# Patient Record
Sex: MALE | Race: WHITE | NOT HISPANIC OR LATINO
[De-identification: names, ages, dates, MRNs, and addresses within clinical notes are randomized per-mention and may not be internally consistent; named-entity substitution may affect disease eponyms.]

---

## 2022-02-28 PROBLEM — Z00.00 ENCOUNTER FOR PREVENTIVE HEALTH EXAMINATION: Status: ACTIVE | Noted: 2022-02-28

## 2022-03-09 ENCOUNTER — APPOINTMENT (OUTPATIENT)
Dept: ENDOCRINOLOGY | Facility: CLINIC | Age: 27
End: 2022-03-09
Payer: COMMERCIAL

## 2022-03-09 VITALS
SYSTOLIC BLOOD PRESSURE: 123 MMHG | HEART RATE: 89 BPM | WEIGHT: 162 LBS | DIASTOLIC BLOOD PRESSURE: 84 MMHG | BODY MASS INDEX: 23.19 KG/M2 | HEIGHT: 70 IN

## 2022-03-09 DIAGNOSIS — E10.9 TYPE 1 DIABETES MELLITUS W/OUT COMPLICATIONS: ICD-10-CM

## 2022-03-09 PROCEDURE — 99204 OFFICE O/P NEW MOD 45 MIN: CPT | Mod: 25

## 2022-03-09 PROCEDURE — 95249 CONT GLUC MNTR PT PROV EQP: CPT

## 2022-03-09 RX ORDER — LANCETS
EACH MISCELLANEOUS
Qty: 100 | Refills: 5 | Status: ACTIVE | COMMUNITY
Start: 2022-03-09 | End: 1900-01-01

## 2022-03-10 LAB
ALBUMIN SERPL ELPH-MCNC: 5.1 G/DL
ALP BLD-CCNC: 75 U/L
ALT SERPL-CCNC: 20 U/L
ANION GAP SERPL CALC-SCNC: 15 MMOL/L
AST SERPL-CCNC: 17 U/L
BILIRUB SERPL-MCNC: 1 MG/DL
BUN SERPL-MCNC: 10 MG/DL
CALCIUM SERPL-MCNC: 10.3 MG/DL
CHLORIDE SERPL-SCNC: 99 MMOL/L
CHOLEST SERPL-MCNC: 230 MG/DL
CO2 SERPL-SCNC: 24 MMOL/L
CREAT SERPL-MCNC: 1.01 MG/DL
CREAT SPEC-SCNC: 77 MG/DL
EGFR: 105 ML/MIN/1.73M2
ESTIMATED AVERAGE GLUCOSE: 126 MG/DL
GLUCOSE SERPL-MCNC: 82 MG/DL
HBA1C MFR BLD HPLC: 6 %
HDLC SERPL-MCNC: 68 MG/DL
LDLC SERPL CALC-MCNC: 151 MG/DL
MICROALBUMIN 24H UR DL<=1MG/L-MCNC: 2.9 MG/DL
MICROALBUMIN/CREAT 24H UR-RTO: 37 MG/G
NONHDLC SERPL-MCNC: 163 MG/DL
POTASSIUM SERPL-SCNC: 4.4 MMOL/L
PROT SERPL-MCNC: 7.5 G/DL
SODIUM SERPL-SCNC: 138 MMOL/L
TRIGL SERPL-MCNC: 58 MG/DL
TSH SERPL-ACNC: 2.05 UIU/ML

## 2022-03-10 NOTE — PHYSICAL EXAM
[Alert] : alert [No Acute Distress] : no acute distress [No Proptosis] : no proptosis [No Lid Lag] : no lid lag [Normal Hearing] : hearing was normal [No LAD] : no lymphadenopathy [Thyroid Not Enlarged] : the thyroid was not enlarged [Clear to Auscultation] : lungs were clear to auscultation bilaterally [Normal S1, S2] : normal S1 and S2 [Regular Rhythm] : with a regular rhythm [No Edema] : no peripheral edema [Pedal Pulses Normal] : the pedal pulses are present [Normal Affect] : the affect was normal [Normal Mood] : the mood was normal [Acanthosis Nigricans] : no acanthosis nigricans [Foot Ulcers] : no foot ulcers [de-identified] : no onychomycoses, good hair growth on feet, (+) callous

## 2022-03-10 NOTE — HISTORY OF PRESENT ILLNESS
[FreeTextEntry1] : Diabetes diagnosed around age 23.\par Was found to have elevated glucose on routine labs, did not have any symptoms.  Initially was diagnosed as type 2, but then after seeing endocrine was told he is type 1.  He has never been hospitalized for diabetes.  no FH of diabetes or autoimmune disease.\par Currently is taking Toujeo 20 units/day and lispro 1 unit per 15-20 g, usually 2-4 units with meals.\par using Scott CGM:  14d avg 129, 84% in range, 14% high, 2% low, 97% CGM use\par Sugars mostly in range.  Some meals causing high sugars for a few hours.   Trend toward hypoglycemia in the early am.\par has hypo awareness.\par Exercises every day -- bikes  daily, also lifts at the gym 3x/week.  does not have hypoglycemia during or after exercise.\par no neuropathy symptoms \par last A1c was 6.0% last March\par \par Meds\par Toujeo 20 units/day\par lispro 1 unit per 15-20g carbs.   2-4 units.  correction 1-2 units to reduce glucose 100 mg/dl

## 2022-03-10 NOTE — CONSULT LETTER
[Dear  ___] : Dear  [unfilled], [Consult Letter:] : I had the pleasure of evaluating your patient, [unfilled]. [Please see my note below.] : Please see my note below. [Consult Closing:] : Thank you very much for allowing me to participate in the care of this patient.  If you have any questions, please do not hesitate to contact me. [Sincerely,] : Sincerely, [FreeTextEntry1] : Mr. Tang has type 1 diabetes that is well controlled.   A1c is 6.0%.  urine microalbumin is mildly elevated, which I would like to repeat (or you can).  LDL is pretty high but since he is very young, I likely will wait to start a statin unless there is strong family of early heart disease.\par  [FreeTextEntry3] : Rima Aviles MD\par Division of Endocrinology\par [facility]

## 2022-03-10 NOTE — ASSESSMENT
[FreeTextEntry1] : Type 1 diabetes, at goal.  Goal A1c < 7%\par Potential complications of diabetes reviewed (blindness, kidney disease, nerve damage) and importance of glucose control discussed to prevent complications. \par Continue frequent glucose monitoring to determine insulin dosing and monitor for hypoglycemia. Upgrade to Scott 2 so that he can get alerts when glucose is too high or too low.  Explained that CGM has 10-20% error on glucose readings, and he should confirm  high and low readings with glucometer. \par Discussed exercise effect on glucose:  depends on duration and intensity of exercise.  Cardio exercise tends to make sugars decrease, while high intensity or weight training tends to increase sugars.  Doing a mix of these exercises may prevent sugars going too high or low afterwards. \par Discussed pros and cons of insulin pump therapy.  Main benefit of pump is if he is having hypoglycemia during or after exercise, or if he needs different basal rate for sera effects, but he has neither of these, so at this point, he is managing diabetes well with MDI and does not need pump.\par RTO 6 months

## 2022-08-18 ENCOUNTER — APPOINTMENT (OUTPATIENT)
Dept: ENDOCRINOLOGY | Facility: CLINIC | Age: 27
End: 2022-08-18

## 2022-08-18 VITALS
DIASTOLIC BLOOD PRESSURE: 68 MMHG | HEIGHT: 70 IN | SYSTOLIC BLOOD PRESSURE: 103 MMHG | HEART RATE: 89 BPM | WEIGHT: 164 LBS | BODY MASS INDEX: 23.48 KG/M2

## 2022-08-18 LAB — HBA1C MFR BLD HPLC: 5.4

## 2022-08-18 PROCEDURE — 83036 HEMOGLOBIN GLYCOSYLATED A1C: CPT | Mod: QW

## 2022-08-18 PROCEDURE — 99214 OFFICE O/P EST MOD 30 MIN: CPT | Mod: 25

## 2022-08-18 PROCEDURE — 36415 COLL VENOUS BLD VENIPUNCTURE: CPT

## 2022-08-18 PROCEDURE — 95249 CONT GLUC MNTR PT PROV EQP: CPT

## 2022-08-18 NOTE — DATA REVIEWED
[No studies available for review at this time.] : No studies available for review at this time. [FreeTextEntry1] : 8/22  A1c 5.4% POC\par 3/22  A1c 6.0%, tot chol 230, trig 58, HDL 68, , urine alb/cr 37, TSH 2.05

## 2022-08-18 NOTE — ASSESSMENT
[FreeTextEntry1] : Type 1 diabetes, below goal.\par Cautioned about overcorrecting sugars and  frequent hypoglycemia, which can lead to hypoglycemia unawareness. Advised not to correct until sugars are over 200 mg/dl and use 75 for correction factor, which means he would only need 1 units if over 200 and 2 units for over 250.\par After giving a correction dose, wait at least 1 hour and if sugars are trending downward, he should not do another correction dose, even if sugars are still high (as long as it is decreasing)\par Change to Dexcom CGM so that he doesn't need to scan to see what sugars are.  Dexcom starter kit given.\par Continue frequent glucose monitoring to determine insulin dosing and monitor for hypoglycemia. \par He has high LDL but since he is still young, CV risk is very low, will wait on starting statin.\par Repeat urine albumin today; was high last check and I don't expect he should have elevated urine albumin since glucose control is very good.\par RTO 6 months

## 2022-08-18 NOTE — PHYSICAL EXAM
[Alert] : alert [No Acute Distress] : no acute distress [No Proptosis] : no proptosis [No Lid Lag] : no lid lag [Normal Hearing] : hearing was normal [No LAD] : no lymphadenopathy [Thyroid Not Enlarged] : the thyroid was not enlarged [Clear to Auscultation] : lungs were clear to auscultation bilaterally [Normal S1, S2] : normal S1 and S2 [Regular Rhythm] : with a regular rhythm [No Edema] : no peripheral edema [Pedal Pulses Normal] : the pedal pulses are present [Normal Affect] : the affect was normal [Normal Mood] : the mood was normal [Acanthosis Nigricans] : no acanthosis nigricans [Foot Ulcers] : no foot ulcers [de-identified] : no onychomycoses, good hair growth on feet, (+) callous

## 2022-08-18 NOTE — HISTORY OF PRESENT ILLNESS
[FreeTextEntry1] : Scott CGM report  reviewed: 14d average 113, 92% in range, 4% high, 4% low, 98% use\par Daily graph showing mostly in range.   sometimes low after lunch, probably due to overcorrecting.\par One day, was very high mid-day (300s) and then had prolonged hypoglycemia (in the 40s).  He was traveling this day, and on the plane, sugars were high and wouldn't come down.  and then sugars were very low.  But this happens very rarely for him\par He is correcting with 2-3 units for sugars over 170.\par he has hypo awareness, usually will get symptoms when sugars are in the 70s.  He lives alone.\par no neuropathy symptoms \par overdue for ophtho, last visit 18 months ago\par \par Meds\par Toujeo 18 units/day\par lispro 1 unit per 15g carbs.   2-4 units.

## 2022-08-19 LAB
CREAT SPEC-SCNC: 172 MG/DL
MICROALBUMIN 24H UR DL<=1MG/L-MCNC: <1.2 MG/DL
MICROALBUMIN/CREAT 24H UR-RTO: NORMAL MG/G

## 2022-10-19 ENCOUNTER — APPOINTMENT (OUTPATIENT)
Dept: ENDOCRINOLOGY | Facility: CLINIC | Age: 27
End: 2022-10-19

## 2022-10-21 ENCOUNTER — NON-APPOINTMENT (OUTPATIENT)
Age: 27
End: 2022-10-21

## 2022-10-21 ENCOUNTER — APPOINTMENT (OUTPATIENT)
Dept: OPHTHALMOLOGY | Facility: CLINIC | Age: 27
End: 2022-10-21

## 2022-10-21 PROCEDURE — 92134 CPTRZ OPH DX IMG PST SGM RTA: CPT

## 2022-10-21 PROCEDURE — 92004 COMPRE OPH EXAM NEW PT 1/>: CPT

## 2023-02-16 ENCOUNTER — APPOINTMENT (OUTPATIENT)
Dept: ENDOCRINOLOGY | Facility: CLINIC | Age: 28
End: 2023-02-16
Payer: COMMERCIAL

## 2023-02-16 VITALS
SYSTOLIC BLOOD PRESSURE: 125 MMHG | DIASTOLIC BLOOD PRESSURE: 77 MMHG | BODY MASS INDEX: 23.39 KG/M2 | WEIGHT: 163 LBS | HEART RATE: 74 BPM

## 2023-02-16 LAB — GLUCOSE BLDC GLUCOMTR-MCNC: 73

## 2023-02-16 PROCEDURE — 82962 GLUCOSE BLOOD TEST: CPT

## 2023-02-16 PROCEDURE — 99213 OFFICE O/P EST LOW 20 MIN: CPT | Mod: 25

## 2023-02-16 PROCEDURE — 95249 CONT GLUC MNTR PT PROV EQP: CPT

## 2023-02-16 RX ORDER — BLOOD SUGAR DIAGNOSTIC
STRIP MISCELLANEOUS 3 TIMES DAILY
Qty: 300 | Refills: 3 | Status: ACTIVE | COMMUNITY
Start: 2022-03-09 | End: 1900-01-01

## 2023-02-16 RX ORDER — BLOOD-GLUCOSE TRANSMITTER
EACH MISCELLANEOUS
Qty: 1 | Refills: 1 | Status: DISCONTINUED | COMMUNITY
Start: 2022-08-18 | End: 2023-02-16

## 2023-02-16 RX ORDER — FLASH GLUCOSE SENSOR
KIT MISCELLANEOUS
Qty: 2 | Refills: 5 | Status: DISCONTINUED | COMMUNITY
Start: 2022-03-09 | End: 2023-02-16

## 2023-02-16 NOTE — HISTORY OF PRESENT ILLNESS
[FreeTextEntry1] : He loves using Dexcom.  Adhesive is better and it stays on longer than Scott.\par Clarity report generated:  14d avg 157, 72% in range, 26% high, 1% low, 93% CGM use\par Daily graph showing mostly in range, kaelyn during the day.   Some highs after meals.   hypoglycemia due to overcorrection but is not frequent.\par He has hypo awareness.   He usually does not go low during exercise, but sometimes during cardio, when he does stair machine for more than 20 minutes.  He has glucose tabs with him.\par saw ophtho, 10/22:  no DR \par \par Meds\par Toujeo 16 units/day\par lispro 1 unit per 15g carbs.   2-4 units.

## 2023-02-16 NOTE — DATA REVIEWED
[No studies available for review at this time.] : No studies available for review at this time. [FreeTextEntry1] : 2/23 A1c 5.8% POC\par 8/22  A1c 5.4% POC, urine alb < 1.2\par 3/22  A1c 6.0%, tot chol 230, trig 58, HDL 68, , urine alb/cr 37, TSH 2.05

## 2023-02-16 NOTE — PHYSICAL EXAM
[Alert] : alert [No Acute Distress] : no acute distress [Normal Affect] : the affect was normal [Normal Mood] : the mood was normal [de-identified] : fingerstick 73

## 2023-02-16 NOTE — ASSESSMENT
[FreeTextEntry1] : Type 1 diabetes, below goal.  No known complications.\par continue current insulin doses.\par Discussed exercise effect on glucose:  depends on duration and intensity of exercise.  Cardio exercise tends to make sugars decrease, while high intensity or weight training tends to increase sugars.  Doing a mix of these exercises may prevent sugars going too high or low afterwards.   Glucose should be over 130 at the start of exercise, and if not, have a small snack before exercising.\par Suggested taking 1 -2 units less Toujeo on mornings he plans to be exercising, to prevent hypoglycemia after exercise.\par Discussed using correction factor to correct hyperglycemia.  He is still insulin sensitive, so he can try correction factor of 60 or 70.   TAke 1 units for glucose over 200 and 2 units if over 250.  \par Continue frequent glucose monitoring to determine insulin dosing and monitor for hypoglycemia.  Upgrade to Dexcom G7.\par Discussed advantages of insulin pump therapy; at this time, he doesn't need the pump since sugars are well controlled without frequent hypoglycemia.  \par RTO 9 months

## 2023-07-20 ENCOUNTER — NON-APPOINTMENT (OUTPATIENT)
Age: 28
End: 2023-07-20

## 2023-07-28 ENCOUNTER — NON-APPOINTMENT (OUTPATIENT)
Age: 28
End: 2023-07-28

## 2023-09-30 ENCOUNTER — RX RENEWAL (OUTPATIENT)
Age: 28
End: 2023-09-30

## 2024-01-01 ENCOUNTER — RX RENEWAL (OUTPATIENT)
Age: 29
End: 2024-01-01

## 2024-01-05 ENCOUNTER — RX RENEWAL (OUTPATIENT)
Age: 29
End: 2024-01-05

## 2024-01-22 RX ORDER — BLOOD-GLUCOSE,RECEIVER,CONT
EACH MISCELLANEOUS
Qty: 1 | Refills: 0 | Status: ACTIVE | COMMUNITY
Start: 2023-07-12 | End: 1900-01-01

## 2024-01-22 RX ORDER — INSULIN LISPRO 100 [IU]/ML
100 INJECTION, SOLUTION INTRAVENOUS; SUBCUTANEOUS
Qty: 2 | Refills: 1 | Status: ACTIVE | COMMUNITY
Start: 2022-03-09 | End: 1900-01-01

## 2024-02-14 ENCOUNTER — APPOINTMENT (OUTPATIENT)
Dept: ENDOCRINOLOGY | Facility: CLINIC | Age: 29
End: 2024-02-14
Payer: COMMERCIAL

## 2024-02-14 VITALS
SYSTOLIC BLOOD PRESSURE: 134 MMHG | HEART RATE: 74 BPM | DIASTOLIC BLOOD PRESSURE: 73 MMHG | BODY MASS INDEX: 24.68 KG/M2 | WEIGHT: 172 LBS

## 2024-02-14 LAB
GLUCOSE BLDC GLUCOMTR-MCNC: 130
HBA1C MFR BLD HPLC: 6.2

## 2024-02-14 PROCEDURE — 82962 GLUCOSE BLOOD TEST: CPT

## 2024-02-14 PROCEDURE — 83036 HEMOGLOBIN GLYCOSYLATED A1C: CPT | Mod: QW

## 2024-02-14 PROCEDURE — 99214 OFFICE O/P EST MOD 30 MIN: CPT | Mod: 25

## 2024-02-14 RX ORDER — BLOOD-GLUCOSE TRANSMITTER
EACH MISCELLANEOUS
Qty: 1 | Refills: 1 | Status: DISCONTINUED | COMMUNITY
Start: 2023-07-24 | End: 2024-02-14

## 2024-02-14 RX ORDER — PEN NEEDLE, DIABETIC 29 G X1/2"
31G X 5 MM NEEDLE, DISPOSABLE MISCELLANEOUS
Qty: 400 | Refills: 3 | Status: ACTIVE | COMMUNITY
Start: 2022-03-09 | End: 1900-01-01

## 2024-02-14 NOTE — PHYSICAL EXAM
[Alert] : alert [No Acute Distress] : no acute distress [No Proptosis] : no proptosis [No Lid Lag] : no lid lag [Normal Hearing] : hearing was normal [No LAD] : no lymphadenopathy [Thyroid Not Enlarged] : the thyroid was not enlarged [Clear to Auscultation] : lungs were clear to auscultation bilaterally [Normal S1, S2] : normal S1 and S2 [Regular Rhythm] : with a regular rhythm [No Edema] : no peripheral edema [Pedal Pulses Normal] : the pedal pulses are present [Acanthosis Nigricans] : no acanthosis nigricans [Foot Ulcers] : no foot ulcers [Normal Sensation on Monofilament Testing] : normal sensation on monofilament testing of lower extremities [Normal Affect] : the affect was normal [Normal Mood] : the mood was normal [de-identified] : fingerstick 130, 144 on Dexcom [de-identified] : no onychomycoses

## 2024-02-14 NOTE — ASSESSMENT
[FreeTextEntry1] : Type 1 diabetes, below goal.  No known complications. continue current insulin doses. Discussed  pro's and con's of insulin pump therapy.  Advantages  include use of different basal rates during the day (eg using increased rate to cover Rosita effect) and use of temporary basal rates after exercise to prevent hypoglycemia. Con's include higher expense, possible pump/pod malfunctions. Since his sugar control is good and he does not have frequent hypoglycemia, I don't think he NEEDS to go on the pump to improve glucose control but if he wants to go on pump, that would be fine. He probably will wait at least until pumps are compatible with Dexcom G7. Continue frequent glucose monitoring to determine insulin dosing and monitor for hypoglycemia.   Sample Lyumjev pen given for more rapid onset.  If he prefers this, we can change to Lyumjev pen. margaret recommended  RTO 1 year

## 2024-02-14 NOTE — HISTORY OF PRESENT ILLNESS
[FreeTextEntry1] : no health issues since last visit not having frequent hypoglycemia.  If he has hypoglycemia, it usually due to overbolusing lispro (overestimating his meal). Some mornings, sugars are high but rarely is over 200 in the morning. no SOB, chest pain, or neuropathy symptoms  saw garrywiley, 10/22:  no DR  He is considering insulin pump;  he has one friend using Omnipod and one using T slim and both say he should get on the pump. He finds sometimes that insulin is not working fast enough.  Meds Toujeo 20  units/day lispro 1 unit per 15g carbs.   2-4 units.

## 2024-02-15 LAB
ALBUMIN SERPL ELPH-MCNC: 4.6 G/DL
ALP BLD-CCNC: 61 U/L
ALT SERPL-CCNC: 20 U/L
ANION GAP SERPL CALC-SCNC: 12 MMOL/L
AST SERPL-CCNC: 20 U/L
BILIRUB SERPL-MCNC: 0.4 MG/DL
BUN SERPL-MCNC: 12 MG/DL
CALCIUM SERPL-MCNC: 9.4 MG/DL
CHLORIDE SERPL-SCNC: 102 MMOL/L
CHOLEST SERPL-MCNC: 185 MG/DL
CO2 SERPL-SCNC: 27 MMOL/L
CREAT SERPL-MCNC: 1.28 MG/DL
CREAT SPEC-SCNC: 114 MG/DL
EGFR: 78 ML/MIN/1.73M2
FRUCTOSAMINE SERPL-MCNC: 272 UMOL/L
GLUCOSE SERPL-MCNC: 155 MG/DL
HDLC SERPL-MCNC: 52 MG/DL
LDLC SERPL CALC-MCNC: 114 MG/DL
MICROALBUMIN 24H UR DL<=1MG/L-MCNC: <1.2 MG/DL
MICROALBUMIN/CREAT 24H UR-RTO: NORMAL MG/G
NONHDLC SERPL-MCNC: 133 MG/DL
POTASSIUM SERPL-SCNC: 4.4 MMOL/L
PROT SERPL-MCNC: 6.9 G/DL
SODIUM SERPL-SCNC: 141 MMOL/L
TRIGL SERPL-MCNC: 108 MG/DL
TSH SERPL-ACNC: 1.28 UIU/ML

## 2024-02-17 RX ORDER — INSULIN GLARGINE 300 U/ML
300 INJECTION, SOLUTION SUBCUTANEOUS
Qty: 3 | Refills: 3 | Status: ACTIVE | COMMUNITY
Start: 2022-03-09 | End: 1900-01-01

## 2024-04-18 RX ORDER — BLOOD-GLUCOSE SENSOR
EACH MISCELLANEOUS
Qty: 9 | Refills: 3 | Status: ACTIVE | COMMUNITY
Start: 2022-08-18 | End: 1900-01-01

## 2024-05-10 ENCOUNTER — APPOINTMENT (OUTPATIENT)
Dept: OPHTHALMOLOGY | Facility: CLINIC | Age: 29
End: 2024-05-10
Payer: COMMERCIAL

## 2024-05-10 ENCOUNTER — NON-APPOINTMENT (OUTPATIENT)
Age: 29
End: 2024-05-10

## 2024-05-10 PROCEDURE — 92134 CPTRZ OPH DX IMG PST SGM RTA: CPT

## 2024-05-10 PROCEDURE — 92014 COMPRE OPH EXAM EST PT 1/>: CPT

## 2024-06-14 ENCOUNTER — APPOINTMENT (OUTPATIENT)
Dept: ENDOCRINOLOGY | Facility: CLINIC | Age: 29
End: 2024-06-14
Payer: COMMERCIAL

## 2024-06-14 VITALS
DIASTOLIC BLOOD PRESSURE: 68 MMHG | BODY MASS INDEX: 23.62 KG/M2 | SYSTOLIC BLOOD PRESSURE: 108 MMHG | WEIGHT: 165 LBS | HEART RATE: 55 BPM | HEIGHT: 70 IN

## 2024-06-14 LAB
GLUCOSE BLDC GLUCOMTR-MCNC: 142
HBA1C MFR BLD HPLC: 6.6

## 2024-06-14 PROCEDURE — 99213 OFFICE O/P EST LOW 20 MIN: CPT | Mod: 25

## 2024-06-14 PROCEDURE — 82962 GLUCOSE BLOOD TEST: CPT

## 2024-06-14 PROCEDURE — 83036 HEMOGLOBIN GLYCOSYLATED A1C: CPT | Mod: QW

## 2024-06-14 NOTE — DATA REVIEWED
[No studies available for review at this time.] : No studies available for review at this time. [FreeTextEntry1] : 2/24  A1c  6.2%, tot chol 185, trig 108, HDL 52, , urine alb < 1.2, TSH 1.27, fructosamine 272 2/23 A1c 5.8% POC 8/22  A1c 5.4% POC, urine alb < 1.2 3/22  A1c 6.0%, tot chol 230, trig 58, HDL 68, , urine alb/cr 37, TSH 2.05

## 2024-06-14 NOTE — PHYSICAL EXAM
[Alert] : alert [No Acute Distress] : no acute distress [No Proptosis] : no proptosis [No Lid Lag] : no lid lag [Normal Hearing] : hearing was normal [No LAD] : no lymphadenopathy [Thyroid Not Enlarged] : the thyroid was not enlarged [Clear to Auscultation] : lungs were clear to auscultation bilaterally [Normal S1, S2] : normal S1 and S2 [Regular Rhythm] : with a regular rhythm [No Edema] : no peripheral edema [Pedal Pulses Normal] : the pedal pulses are present [Normal Sensation on Monofilament Testing] : normal sensation on monofilament testing of lower extremities [Normal Affect] : the affect was normal [Normal Mood] : the mood was normal [Acanthosis Nigricans] : no acanthosis nigricans [Foot Ulcers] : no foot ulcers [de-identified] : fingerstick 130, 144 on Dexcom [de-identified] : no onychomycoses

## 2024-06-14 NOTE — ASSESSMENT
[FreeTextEntry1] : Type 1 diabetes, below goal.  No known complications. continue current insulin doses. Since sugars are high about 25% of the time, he can look at his graph (not able to download it now) and see which foods are causing sugars to be high, and take more lispro for those meals. For now, he is not interested in insulin pump therapy; he may consider it during wintertime. RTO 1 year

## 2024-07-11 ENCOUNTER — RX RENEWAL (OUTPATIENT)
Age: 29
End: 2024-07-11

## 2024-10-16 ENCOUNTER — RX RENEWAL (OUTPATIENT)
Age: 29
End: 2024-10-16

## 2025-01-16 ENCOUNTER — RX RENEWAL (OUTPATIENT)
Age: 30
End: 2025-01-16

## 2025-01-27 ENCOUNTER — APPOINTMENT (OUTPATIENT)
Dept: ENDOCRINOLOGY | Facility: CLINIC | Age: 30
End: 2025-01-27
Payer: COMMERCIAL

## 2025-01-27 VITALS
BODY MASS INDEX: 23.68 KG/M2 | HEART RATE: 96 BPM | WEIGHT: 165 LBS | SYSTOLIC BLOOD PRESSURE: 99 MMHG | DIASTOLIC BLOOD PRESSURE: 64 MMHG

## 2025-01-27 LAB
GLUCOSE BLDC GLUCOMTR-MCNC: 295
HBA1C MFR BLD HPLC: 7.3

## 2025-01-27 PROCEDURE — 83036 HEMOGLOBIN GLYCOSYLATED A1C: CPT | Mod: QW

## 2025-01-27 PROCEDURE — 82962 GLUCOSE BLOOD TEST: CPT

## 2025-01-27 PROCEDURE — 99214 OFFICE O/P EST MOD 30 MIN: CPT

## 2025-01-27 PROCEDURE — 36415 COLL VENOUS BLD VENIPUNCTURE: CPT

## 2025-01-27 PROCEDURE — 95249 CONT GLUC MNTR PT PROV EQP: CPT

## 2025-01-29 LAB
ALBUMIN SERPL ELPH-MCNC: 4.2 G/DL
ALP BLD-CCNC: 84 U/L
ALT SERPL-CCNC: 22 U/L
ANION GAP SERPL CALC-SCNC: 15 MMOL/L
AST SERPL-CCNC: 21 U/L
BILIRUB SERPL-MCNC: 0.8 MG/DL
BUN SERPL-MCNC: 10 MG/DL
CALCIUM SERPL-MCNC: 9.6 MG/DL
CHLORIDE SERPL-SCNC: 98 MMOL/L
CHOLEST SERPL-MCNC: 202 MG/DL
CO2 SERPL-SCNC: 24 MMOL/L
CREAT SERPL-MCNC: 0.97 MG/DL
CREAT SPEC-SCNC: 81 MG/DL
EGFR: 108 ML/MIN/1.73M2
GLUCOSE SERPL-MCNC: 216 MG/DL
HDLC SERPL-MCNC: 50 MG/DL
LDLC SERPL CALC-MCNC: 127 MG/DL
MICROALBUMIN 24H UR DL<=1MG/L-MCNC: <1.2 MG/DL
MICROALBUMIN/CREAT 24H UR-RTO: NORMAL MG/G
NONHDLC SERPL-MCNC: 152 MG/DL
POTASSIUM SERPL-SCNC: 4.1 MMOL/L
PROT SERPL-MCNC: 6.6 G/DL
SODIUM SERPL-SCNC: 136 MMOL/L
TRIGL SERPL-MCNC: 142 MG/DL
TSH SERPL-ACNC: 1.65 UIU/ML

## 2025-02-05 ENCOUNTER — NON-APPOINTMENT (OUTPATIENT)
Age: 30
End: 2025-02-05

## 2025-03-07 RX ORDER — INSULIN LISPRO 100 [IU]/ML
100 INJECTION, SOLUTION INTRAVENOUS; SUBCUTANEOUS
Qty: 30 | Refills: 4 | Status: ACTIVE | COMMUNITY
Start: 2025-03-07 | End: 1900-01-01

## 2025-03-12 RX ORDER — INSULIN ASPART 100 [IU]/ML
100 INJECTION, SOLUTION INTRAVENOUS; SUBCUTANEOUS
Qty: 30 | Refills: 5 | Status: ACTIVE | COMMUNITY
Start: 2025-03-12 | End: 1900-01-01

## 2025-04-16 ENCOUNTER — APPOINTMENT (OUTPATIENT)
Dept: ENDOCRINOLOGY | Facility: CLINIC | Age: 30
End: 2025-04-16

## 2025-04-16 VITALS
SYSTOLIC BLOOD PRESSURE: 139 MMHG | DIASTOLIC BLOOD PRESSURE: 70 MMHG | HEIGHT: 70 IN | WEIGHT: 165 LBS | BODY MASS INDEX: 23.62 KG/M2 | HEART RATE: 67 BPM

## 2025-04-16 LAB
GLUCOSE BLDC GLUCOMTR-MCNC: 169
HBA1C MFR BLD HPLC: 6.7

## 2025-04-16 PROCEDURE — 99214 OFFICE O/P EST MOD 30 MIN: CPT | Mod: 25

## 2025-04-16 PROCEDURE — 82962 GLUCOSE BLOOD TEST: CPT

## 2025-04-16 PROCEDURE — 95251 CONT GLUC MNTR ANALYSIS I&R: CPT

## 2025-04-16 PROCEDURE — 83036 HEMOGLOBIN GLYCOSYLATED A1C: CPT | Mod: QW

## 2025-05-16 ENCOUNTER — RX RENEWAL (OUTPATIENT)
Age: 30
End: 2025-05-16

## 2025-09-19 ENCOUNTER — APPOINTMENT (OUTPATIENT)
Dept: OPHTHALMOLOGY | Facility: CLINIC | Age: 30
End: 2025-09-19
Payer: COMMERCIAL

## 2025-09-19 ENCOUNTER — NON-APPOINTMENT (OUTPATIENT)
Age: 30
End: 2025-09-19

## 2025-09-19 PROCEDURE — 92014 COMPRE OPH EXAM EST PT 1/>: CPT

## 2025-09-19 PROCEDURE — 92134 CPTRZ OPH DX IMG PST SGM RTA: CPT
